# Patient Record
Sex: FEMALE | Race: WHITE | NOT HISPANIC OR LATINO | Employment: UNEMPLOYED | URBAN - METROPOLITAN AREA
[De-identification: names, ages, dates, MRNs, and addresses within clinical notes are randomized per-mention and may not be internally consistent; named-entity substitution may affect disease eponyms.]

---

## 2020-06-16 DIAGNOSIS — O09.92 HIGH-RISK PREGNANCY IN SECOND TRIMESTER: Primary | ICD-10-CM

## 2020-06-22 ENCOUNTER — INITIAL CONSULT (OUTPATIENT)
Dept: MATERNAL FETAL MEDICINE | Facility: CLINIC | Age: 25
End: 2020-06-22
Payer: OTHER GOVERNMENT

## 2020-06-22 VITALS
RESPIRATION RATE: 20 BRPM | DIASTOLIC BLOOD PRESSURE: 76 MMHG | HEIGHT: 62 IN | BODY MASS INDEX: 28.89 KG/M2 | SYSTOLIC BLOOD PRESSURE: 118 MMHG | HEART RATE: 98 BPM | WEIGHT: 157 LBS | OXYGEN SATURATION: 98 %

## 2020-06-22 DIAGNOSIS — O09.92 HIGH-RISK PREGNANCY IN SECOND TRIMESTER: ICD-10-CM

## 2020-06-22 PROCEDURE — 76811 OB US DETAILED SNGL FETUS: CPT | Mod: GT,S$GLB,, | Performed by: OBSTETRICS & GYNECOLOGY

## 2020-06-22 PROCEDURE — 99204 PR OFFICE/OUTPT VISIT, NEW, LEVL IV, 45-59 MIN: ICD-10-PCS | Mod: 25,S$GLB,, | Performed by: OBSTETRICS & GYNECOLOGY

## 2020-06-22 PROCEDURE — 76811 PR US, OB FETAL EVAL & EXAM, TRANSABDOM,FIRST GESTATION: ICD-10-PCS | Mod: GT,S$GLB,, | Performed by: OBSTETRICS & GYNECOLOGY

## 2020-06-22 PROCEDURE — 99204 OFFICE O/P NEW MOD 45 MIN: CPT | Mod: 25,S$GLB,, | Performed by: OBSTETRICS & GYNECOLOGY

## 2020-06-22 RX ORDER — SERTRALINE HYDROCHLORIDE 100 MG/1
TABLET, FILM COATED ORAL
COMMUNITY
Start: 2020-05-27

## 2020-06-22 RX ORDER — ADALIMUMAB 40MG/0.8ML
KIT SUBCUTANEOUS
COMMUNITY

## 2020-06-22 RX ORDER — FAMOTIDINE 20 MG/1
TABLET, FILM COATED ORAL
COMMUNITY
Start: 2020-04-17

## 2020-06-22 RX ORDER — TRAZODONE HYDROCHLORIDE 150 MG/1
TABLET ORAL
COMMUNITY
Start: 2020-05-22

## 2020-06-22 NOTE — LETTER
June 22, 2020        [facility]  [address]  [place] [address]  Phone: [phone]  Fax: [phone]   Patient: Delisa Jones   MR Number: [id number]   YOB: 1995   Date of Visit: 6/22/2020       Dear Dr. Jose Mejia:    Thank you for referring Delisa Jones to me for evaluation. Attached you will find relevant portions of my assessment and plan of care.    If you have questions, please do not hesitate to call me. I look forward to following Delisa Jones along with you.    Sincerely,      Zeyad Sarmiento MD      CC  [facility]

## 2020-06-22 NOTE — PROGRESS NOTES
Indication for consultation:  Medication exposure in pregnancy    Provider requesting consultation:  Dr. Mejia    Dear Dr. Mejia,    I had the pleasure of seeing Delisa Jones for initial consultation today via telemedicine.  As you recall she is a 24 y.o.  at 20w1d here for consultation regarding medication exposure in pregnancy.    The patient has a known history of psoriasis for approximately 2 years.  When she has a flare this usually affects her face and scalp.  She currently takes Humira 40 mg/0.4 ml every other week.  Her last dose was in mid January.  She self-discontinued secondary to fear of immunosuppression in the face of of Covid pandemic.    PMH:  As above for psoriasis.  The patient also has a known history of postpartum depression/depression for which she currently takes sertraline each day.  She also has a history of insomnia for which she currently takes trazodone 150 mg each evening for sleep.  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    Ob Hx:  This is the patient's 2nd pregnancy.  She has 1 prior spontaneous vaginal delivery at 39 weeks of a 6 lb 8 oz male  that was complicated with gestational hypertension at term.    PSH:  Provencal tooth extraction.  She has also previously had dental implant.    SOC:  She denies any tobacco, alcohol or recreational drug use.    Medications:  Humira 40 mg /0.4 mL; sertraline 100 mg per day.  Trazodone 150 mg at bedtime.  Sertraline 100 mg once per day.  Famotidine 20 mg each day.    Family hx:  She does have a remote family history of cleft lip and palate on the father of the baby side of the family.    Allergies:  She is allergic to sulfa medications    Review of systems: The patient denies any vaginal bleeding, loss of fluid or contraction pain today.  Vitals:    20 1054   BP: 118/76   Pulse: 98   Resp: 20   Weight:157#    Physical exam:  Gen: WDWN in  NAD  HEENT: WNL  Abdomen: Soft, non-tender on sonographer's exam.  Skin: No rash or jaundice  Extremities: No clubbing or cyanosis.  There did not appear to be any evidence of psoriasis on her facial skin evaluation today.  Neuro: Grossly intact    Ultrasound:  A detailed fetal anatomical survey was performed today.  There is a single intrauterine pregnancy in the breech presentation.  Structurally the fetus did not show any major abnormalities, though views of the fetal nose and lip were suboptimal secondary to positioning.  The placenta is posterior with no evidence of previa.  The amniotic fluid volume appears to be normal.  The cervix appears to be closed via transabdominal assessment.  Please see our official report for full specifics.    Recommendations:  Today I reviewed with the patient her history of psoriasis and her exposure to Humira.  I reviewed with the patient the American College of Obstetrics and Gynecology stance on immune modulation in pregnancy (Committee Opinion Number 776).  Humira is a TNF-alpha inhibitor and is classified as a low risk emergent therapy with developing evidence.  Currently the patient is not taking this medication however if she does have a flare I am not opposed to her taking the medication based off of the ACOG guidelines.  There is a theoretical concern for  immune suppression however that does not appear to be documented clinically at this time.    I also discussed with the patient the use of sertraline and trazodone.  I reviewed with the patient the dependent nature that these medications can exposed to the .  I counseled the patient that if she is not able to perform her daily function without these medications then I would suggest that she continue these throughout pregnancy.  If she is able to function without these medications then I am not opposed to discontinuing them however this does put her at significant risk for postpartum depression.  For now I  would recommend that she continue these medications as they are benefit seems to me to outweigh the risks.    Lastly because of her family history of a cleft lip and palate and our inability to clear the facial views today, I would like for her to come back for 1 final assessment of fetal anatomy and growth.    Thanks once again for allowing us to participate in the care of your patients.  If you have any questions about today's consultation feel free to contact me or my partners at [phone].    Sincerely,

## 2020-06-22 NOTE — PROGRESS NOTES
"Delisa is here for initial MFM consultation, referred by Dr. Jose Mejia at [facility] for medication exposure; on Humira prn for psoriasis.    She is feeling fetal movement.    Delisa denies vaginal bleeding, loss of fluid, recurrent cramping, but is concerned with pressure/discomfort across lower abdomen.      Vitals:    06/22/20 1054   BP: 118/76   Pulse: 98   Resp: 20   SpO2: 98%   Weight: 71.2 kg (157 lb)   Height: 5' 2" (1.575 m)      BMI:                    28.72 kg/m^2               "

## 2020-07-21 DIAGNOSIS — O09.92 HIGH-RISK PREGNANCY IN SECOND TRIMESTER: Primary | ICD-10-CM

## 2020-07-27 ENCOUNTER — PROCEDURE VISIT (OUTPATIENT)
Dept: MATERNAL FETAL MEDICINE | Facility: CLINIC | Age: 25
End: 2020-07-27
Payer: OTHER GOVERNMENT

## 2020-07-27 VITALS
HEIGHT: 62 IN | WEIGHT: 162 LBS | SYSTOLIC BLOOD PRESSURE: 110 MMHG | HEART RATE: 70 BPM | OXYGEN SATURATION: 98 % | RESPIRATION RATE: 20 BRPM | DIASTOLIC BLOOD PRESSURE: 70 MMHG | BODY MASS INDEX: 29.81 KG/M2

## 2020-07-27 DIAGNOSIS — O09.92 HIGH-RISK PREGNANCY IN SECOND TRIMESTER: ICD-10-CM

## 2020-07-27 PROCEDURE — 76816 OB US FOLLOW-UP PER FETUS: CPT | Mod: S$GLB,,, | Performed by: OBSTETRICS & GYNECOLOGY

## 2020-07-27 PROCEDURE — 76816 PR  US,PREGNANT UTERUS,F/U,TRANSABD APP: ICD-10-PCS | Mod: S$GLB,,, | Performed by: OBSTETRICS & GYNECOLOGY

## 2020-07-27 PROCEDURE — 99212 OFFICE O/P EST SF 10 MIN: CPT | Mod: 25,S$GLB,, | Performed by: OBSTETRICS & GYNECOLOGY

## 2020-07-27 PROCEDURE — 99212 PR OFFICE/OUTPT VISIT, EST, LEVL II, 10-19 MIN: ICD-10-PCS | Mod: 25,S$GLB,, | Performed by: OBSTETRICS & GYNECOLOGY

## 2020-07-27 NOTE — PROGRESS NOTES
Indication for follow up consultation:  Medication exposure in pregnancy  Family history of cleft lip and palate    Provider requesting consultation:  Dr. Mejia    Dear Dr. Mejia    I had the pleasure of seeing Delisa Jones for follow up consultation today.  As you recall she is a 24 y.o.  at 25w1d here for follow up consultation regarding medication exposure in pregnancy and family history of cleft lip/palate.    See my prior consultation note for complete details.  Patient is here today to complete the fetal anatomical survey that was suboptimal on our last visit.  In particular, at her last visit we were not able to clear views of the fetal face.    Review of systems: The patient denies any vaginal bleeding, loss of fluid or contraction pain today.  Vitals:    20 1126   BP: 110/70   Pulse: 70   Resp: 20       Physical exam:  Gen: WDWN in NAD  HEENT: WNL  Abdomen: Soft, non-tender  Skin: No rash or jaundice  Extremities: No clubbing or cyanosis  Neuro: Grossly intact    Ultrasound:  A repeat detailed fetal anatomical survey was completed once again today.  There is a single intrauterine pregnancy in the cephalic presentation.  The estimated fetal weight is 286 grams which is the 11th percentile for this gestational age. The fetus did not show any overt evidence of structure abnormalities.  The amniotic fluid volume appears to be normal. The placenta does not show any evidence of previa.  Please see our official report for further specifics.    Recommendations:  Please see my prior consultation note regarding medication use in pregnancy.  Since today's ultrasound appeared normal, and the fetal growth was reassuring, I did not make any further follow-up visits with our facility.    Thanks once again for allowing us to participate in the care of your patients.  If you have any questions about today's consultation feel free to contact me or my partners at [phone].    Sincerely,

## 2020-07-27 NOTE — PROGRESS NOTES
"Delisa is here for followup [facility] consultation for Humira use in pregnancy, referred by Dr. Jose Mejia at [facility].    She is feeling fetal movement.    Delisa denies vaginal bleeding, loss of fluid, recurrent contractions, but is having "a lot of trouble with gastric reflux, and the pharmacy is out of Famotidine."    Vitals:    07/27/20 1126   BP: 110/70   Pulse: 70   Resp: 20   SpO2: 98%   Weight: 73.5 kg (162 lb)   Height: 5' 2" (1.575 m)     BMI:                    29.63 kg/m^2     "

## 2020-07-27 NOTE — LETTER
July 27, 2020        [facility]  [address]  [place] [address]  Phone: [phone]  Fax: [phone]   Patient: Delisa Jones   MR Number: [id number]   YOB: 1995   Date of Visit: 7/27/2020       Dear Dr. Jose Mejia:    Thank you for referring Delisa Jones to me for evaluation. Below are the relevant portions of my assessment and plan of care.            If you have questions, please do not hesitate to call me. I look forward to following Delisa along with you.    Sincerely,      Zeyad Sarmiento MD        [facility]